# Patient Record
Sex: MALE | Race: BLACK OR AFRICAN AMERICAN | Employment: UNEMPLOYED | URBAN - NONMETROPOLITAN AREA
[De-identification: names, ages, dates, MRNs, and addresses within clinical notes are randomized per-mention and may not be internally consistent; named-entity substitution may affect disease eponyms.]

---

## 2020-01-23 ENCOUNTER — HOSPITAL ENCOUNTER (EMERGENCY)
Age: 20
Discharge: HOME | End: 2020-01-23
Payer: COMMERCIAL

## 2020-01-23 VITALS
DIASTOLIC BLOOD PRESSURE: 77 MMHG | BODY MASS INDEX: 29.8 KG/M2 | RESPIRATION RATE: 16 BRPM | SYSTOLIC BLOOD PRESSURE: 136 MMHG | OXYGEN SATURATION: 99 % | WEIGHT: 220 LBS | HEIGHT: 72 IN | TEMPERATURE: 98.2 F | HEART RATE: 73 BPM

## 2020-01-23 LAB
FLU A ANTIGEN: NEGATIVE
FLU B ANTIGEN: NEGATIVE
GROUP A STREP CULTURE, REFLEX: NEGATIVE
REFLEX THROAT C + S: NORMAL

## 2020-01-23 PROCEDURE — 2500000003 HC RX 250 WO HCPCS: Performed by: NURSE PRACTITIONER

## 2020-01-23 PROCEDURE — 87880 STREP A ASSAY W/OPTIC: CPT

## 2020-01-23 PROCEDURE — 87070 CULTURE OTHR SPECIMN AEROBIC: CPT

## 2020-01-23 PROCEDURE — 6370000000 HC RX 637 (ALT 250 FOR IP): Performed by: NURSE PRACTITIONER

## 2020-01-23 PROCEDURE — 99283 EMERGENCY DEPT VISIT LOW MDM: CPT

## 2020-01-23 PROCEDURE — 87804 INFLUENZA ASSAY W/OPTIC: CPT

## 2020-01-23 RX ORDER — NAPROXEN 500 MG/1
250 TABLET ORAL 2 TIMES DAILY WITH MEALS
Qty: 15 TABLET | Refills: 0 | Status: SHIPPED | OUTPATIENT
Start: 2020-01-23 | End: 2020-11-25 | Stop reason: SINTOL

## 2020-01-23 RX ORDER — NAPROXEN 250 MG/1
250 TABLET ORAL ONCE
Status: COMPLETED | OUTPATIENT
Start: 2020-01-23 | End: 2020-01-23

## 2020-01-23 RX ORDER — NAPROXEN 250 MG/1
TABLET ORAL
Status: DISCONTINUED
Start: 2020-01-23 | End: 2020-01-23 | Stop reason: HOSPADM

## 2020-01-23 RX ORDER — CETIRIZINE HYDROCHLORIDE 10 MG/1
10 TABLET ORAL DAILY
Qty: 30 TABLET | Refills: 0 | Status: SHIPPED | OUTPATIENT
Start: 2020-01-23 | End: 2020-02-22

## 2020-01-23 RX ADMIN — NAPROXEN 250 MG: 250 TABLET ORAL at 10:27

## 2020-01-23 RX ADMIN — PHENYLEPHRINE HYDROCHLORIDE 1 SPRAY: 0.5 SPRAY NASAL at 09:46

## 2020-01-23 RX ADMIN — Medication 5 ML: at 09:46

## 2020-01-23 ASSESSMENT — ENCOUNTER SYMPTOMS
FACIAL SWELLING: 0
BACK PAIN: 0
WHEEZING: 0
EYE REDNESS: 0
CHEST TIGHTNESS: 0
NAUSEA: 0
COUGH: 1
EYE PAIN: 0
DIARRHEA: 0
SINUS PAIN: 1
CHOKING: 0
SHORTNESS OF BREATH: 0
SORE THROAT: 1
VOICE CHANGE: 0
RHINORRHEA: 1
COLOR CHANGE: 0
SINUS PRESSURE: 1
VOMITING: 0
TROUBLE SWALLOWING: 0

## 2020-01-23 ASSESSMENT — PAIN DESCRIPTION - PROGRESSION: CLINICAL_PROGRESSION: NOT CHANGED

## 2020-01-23 ASSESSMENT — PAIN DESCRIPTION - PAIN TYPE: TYPE: ACUTE PAIN

## 2020-01-23 ASSESSMENT — PAIN SCALES - GENERAL
PAINLEVEL_OUTOF10: 4
PAINLEVEL_OUTOF10: 4

## 2020-01-23 ASSESSMENT — PAIN DESCRIPTION - FREQUENCY: FREQUENCY: CONTINUOUS

## 2020-01-23 ASSESSMENT — PAIN DESCRIPTION - ONSET: ONSET: ON-GOING

## 2020-01-23 ASSESSMENT — PAIN DESCRIPTION - ORIENTATION: ORIENTATION: RIGHT

## 2020-01-23 ASSESSMENT — PAIN DESCRIPTION - LOCATION: LOCATION: EAR

## 2020-01-23 ASSESSMENT — PAIN DESCRIPTION - DESCRIPTORS: DESCRIPTORS: ACHING

## 2020-01-23 NOTE — ED PROVIDER NOTES
change and wound. Allergic/Immunologic: Negative for immunocompromised state. Neurological: Negative for dizziness and weakness. Hematological: Positive for adenopathy. Psychiatric/Behavioral: Negative for agitation, behavioral problems and confusion. The patient is not nervous/anxious. PAST MEDICAL HISTORY   History reviewed. No pertinent past medical history. SURGICALHISTORY      has no past surgical history on file. CURRENT MEDICATIONS       Discharge Medication List as of 1/23/2020 10:26 AM          ALLERGIES     has No Known Allergies. FAMILY HISTORY     has no family status information on file. family history is not on file.     SOCIAL HISTORY       Social History     Socioeconomic History    Marital status: Single     Spouse name: Not on file    Number of children: Not on file    Years of education: Not on file    Highest education level: Not on file   Occupational History    Not on file   Social Needs    Financial resource strain: Not on file    Food insecurity:     Worry: Not on file     Inability: Not on file    Transportation needs:     Medical: Not on file     Non-medical: Not on file   Tobacco Use    Smoking status: Never Smoker    Smokeless tobacco: Never Used   Substance and Sexual Activity    Alcohol use: Yes     Comment: occ    Drug use: Yes     Types: Marijuana     Comment: last used last week     Sexual activity: Not on file   Lifestyle    Physical activity:     Days per week: Not on file     Minutes per session: Not on file    Stress: Not on file   Relationships    Social connections:     Talks on phone: Not on file     Gets together: Not on file     Attends Latter day service: Not on file     Active member of club or organization: Not on file     Attends meetings of clubs or organizations: Not on file     Relationship status: Not on file    Intimate partner violence:     Fear of current or ex partner: Not on file     Emotionally abused: Not on file present. Mental Status: He is alert and oriented to person, place, and time. Psychiatric:         Mood and Affect: Mood normal.         Behavior: Behavior normal.         Thought Content: Thought content normal.         Judgment: Judgment normal.         DIFFERENTIAL DIAGNOSIS:   Viral URI, influenza, pneumonia    DIAGNOSTIC RESULTS       RADIOLOGY: non-plainfilm images(s) such as CT, Ultrasound and MRI are read by the radiologist.  Plain radiographic images are visualized and preliminarily interpreted by the emergency physician unless otherwise stated below. No orders to display         LABS:   Labs Reviewed   RAPID INFLUENZA A/B ANTIGENS   THROAT CULTURE    Narrative:     Source: throat       Site:           Current Antibiotics: not stated   GROUP A STREP, REFLEX       EMERGENCY DEPARTMENT COURSE:   Vitals:    Vitals:    01/23/20 0908   BP: 136/77   Pulse: 73   Resp: 16   Temp: 98.2 °F (36.8 °C)   TempSrc: Oral   SpO2: 99%   Weight: 220 lb (99.8 kg)   Height: 6' (1.829 m)       MDM  Patient was seen and evaluated in the emergency department, patient appeared to be no acute distress, vital signs were reviewed, no significant findings were noted. Physical exam was completed, he had no significant tonsillar edema erythema or exudate, he had middle ear effusion in the right ear, he had some anterior cervical lymphadenopathy, influenza and strep testing were completed. Results were negative. He was treated with Magic mouthwash, and Afrin, and Naprosyn. I noted improvement in symptoms. I discussed my finding my plan of care with patient he was amenable with discharge. Medications   magic (miracle) mouthwash (5 mLs Swish & Swallow Given 1/23/20 0946)   phenylephrine (BRAYDON-SYNEPHRINE) 0.5 % nasal spray 1 spray (1 spray Each Nostril Given 1/23/20 0946)   naproxen (NAPROSYN) tablet 250 mg (250 mg Oral Given 1/23/20 1027)       Patient was seenindependently by myself.  The patient's final impression and disposition and plan was determined by myself. CRITICAL CARE:   None    CONSULTS:  None    PROCEDURES:  None    FINAL IMPRESSION     1. Viral URI with cough          DISPOSITION/PLAN   Patient was discharged in stable condition    PATIENT REFERREDTO:  [facility] [place] [address],[address]  Call in 1 week  If symptoms worsen or fail to improve      DISCHARGE MEDICATIONS:  Discharge Medication List as of 1/23/2020 10:26 AM      START taking these medications    Details   cetirizine (ZYRTEC) 10 MG tablet Take 1 tablet by mouth daily, Disp-30 tablet, R-0Print      naproxen (NAPROSYN) 500 MG tablet Take 0.5 tablets by mouth 2 times daily (with meals) for 30 doses, Disp-15 tablet, R-0Print      Magic Mouthwash (MIRACLE MOUTHWASH) Swish and swallow 5 mLs 4 times daily as needed for Irritation 1:1:1, lidocaine, diphenhydramine, maalox, Disp-240 mL, R-0Print             (Please note that portions of this note were completed with a voice recognition program.  Efforts were made to edit the dictations but occasionally words are mis-transcribed.)      Provider:  I personally performed the services described in the documentation,reviewed and edited the documentation which was dictated to the scribe in my presence, and it accurately records my words and actions.     Bossman Stone CNP 01/23/20 8:55 PM    Dori Stone, APRN - CNP        Spyder Lynk, TAMARA - CNP  01/23/20 5735

## 2020-01-25 LAB — THROAT/NOSE CULTURE: NORMAL

## 2020-07-07 ENCOUNTER — HOSPITAL ENCOUNTER (EMERGENCY)
Age: 20
Discharge: HOME | End: 2020-07-07
Payer: COMMERCIAL

## 2020-07-07 VITALS
WEIGHT: 215 LBS | TEMPERATURE: 97.9 F | DIASTOLIC BLOOD PRESSURE: 91 MMHG | HEART RATE: 71 BPM | HEIGHT: 72 IN | RESPIRATION RATE: 18 BRPM | SYSTOLIC BLOOD PRESSURE: 142 MMHG | BODY MASS INDEX: 29.12 KG/M2 | OXYGEN SATURATION: 99 %

## 2020-07-07 PROCEDURE — U0003 INFECTIOUS AGENT DETECTION BY NUCLEIC ACID (DNA OR RNA); SEVERE ACUTE RESPIRATORY SYNDROME CORONAVIRUS 2 (SARS-COV-2) (CORONAVIRUS DISEASE [COVID-19]), AMPLIFIED PROBE TECHNIQUE, MAKING USE OF HIGH THROUGHPUT TECHNOLOGIES AS DESCRIBED BY CMS-2020-01-R: HCPCS

## 2020-07-07 PROCEDURE — 99213 OFFICE O/P EST LOW 20 MIN: CPT

## 2020-07-07 PROCEDURE — 99214 OFFICE O/P EST MOD 30 MIN: CPT | Performed by: NURSE PRACTITIONER

## 2020-07-07 ASSESSMENT — ENCOUNTER SYMPTOMS
ABDOMINAL PAIN: 0
VOMITING: 0
NAUSEA: 0
DIARRHEA: 0
COUGH: 0
EYE REDNESS: 0
SORE THROAT: 0
EYE ITCHING: 0
SHORTNESS OF BREATH: 0
SINUS PRESSURE: 0

## 2020-07-07 NOTE — ED TRIAGE NOTES
Patient states he was at a party Saturday where he was in contact with a family member that has now tested positive for COVID.

## 2020-07-07 NOTE — ED PROVIDER NOTES
[address]       Chief Complaint   Patient presents with    Concern For COVID-19       Nurses Notes reviewed and I agree except as noted in the HPI. HISTORY OF PRESENT ILLNESS   Nael Lopez is a 22 y.o. male who presents with request for COVID-19 testing after exposure to someone that has tested positive for COVID-19 at a Fourth of July party over the weekend. States that he does not have any symptoms and is just requesting testing. REVIEW OF SYSTEMS     Review of Systems   Constitutional: Negative for chills and fever. HENT: Negative for congestion, ear pain, sinus pressure and sore throat. Eyes: Negative for redness and itching. Respiratory: Negative for cough and shortness of breath. Cardiovascular: Negative for chest pain and palpitations. Gastrointestinal: Negative for abdominal pain, diarrhea, nausea and vomiting. Musculoskeletal: Negative for joint swelling and myalgias. Skin: Negative for rash. Allergic/Immunologic: Negative for environmental allergies and food allergies. Neurological: Negative for dizziness and headaches. PAST MEDICAL HISTORY   History reviewed. No pertinent past medical history. SURGICAL HISTORY     Patient  has no past surgical history on file. CURRENT MEDICATIONS       Previous Medications    MAGIC MOUTHWASH (MIRACLE MOUTHWASH)    Swish and swallow 5 mLs 4 times daily as needed for Irritation 1:1:1, lidocaine, diphenhydramine, maalox    NAPROXEN (NAPROSYN) 500 MG TABLET    Take 0.5 tablets by mouth 2 times daily (with meals) for 30 doses       ALLERGIES     Patient is has No Known Allergies. FAMILY HISTORY     Patient'sfamily history is not on file. SOCIAL HISTORY     Patient  reports that he has never smoked. He has never used smokeless tobacco. He reports current alcohol use. He reports current drug use. Drug: Marijuana.     PHYSICAL EXAM     ED TRIAGE VITALS  BP: (!) DISPOSITION    Discharge   COVID-19 test result will be known in 4 days. Self quarantine until test result is known. If applicable, work note has been provided to be off until COVID-19 test results. Follow with family provider for further work note needs. Physical assessment findings, diagnostic testing(s) if applicable, and vital signs reviewed with patient/patient representative. Questions answered. If applicable, patient/patient representative will be contacted upon receipt of final culture and sensitivity or other testing results when available. Any additions or changes to medications or changes the plan of care will be made at that time. Medications as directed, including OTC medications for supportive care. Education provided on medications. Differential diagnosis(s) discussed with patient/patient representative. Home care/self care instructions reviewed with patient/patient representative. Patient is to follow-up with family care provider in 2-3 days if no improvement. Patient is to go to the emergency department if symptoms worsen. Patient/patient representative is aware of care plan, questions answered, verbalizes understanding and is in agreement. Teach back method used for patient/patient representative teaching(s) and printed instructions attached to after visit summary. Problem List Items Addressed This Visit     None      Visit Diagnoses     Encounter for laboratory testing for COVID-19 virus    -  Primary    Exposure to COVID-19 virus              PATIENT REFERRED TO:  [facility]  [address]  [facility]  [phone]    as needed, If symptoms change/worsen, go to the [address] [place] [address],[address]  [address] [address] [facility] [address] [facility]  Schedule an appointment as soon as possible for a visit in 3 days  if you do not have a family provider, If symptoms change/worsen, go to the [address] [facility], [address], APRN - CNP  07/07/20 4085

## 2020-07-07 NOTE — ED NOTES
Patient verbalized understanding of discharge instructions. Denies questions or concerns at this time .      Dinora Caldwell RN  07/07/20 1038

## 2020-07-08 ENCOUNTER — CARE COORDINATION (OUTPATIENT)
Dept: CARE COORDINATION | Age: 20
End: 2020-07-08

## 2020-07-08 NOTE — CARE COORDINATION
Patient contacted regarding COVID-19 exposure. Discussed COVID-19 related testing which was pending at this time. Test results were pending. Patient informed of results, if available? N/A - test results remain pending    Care Transition Nurse/ Ambulatory Care Manager contacted the patient by telephone to perform post discharge assessment. Verified name and  with patient as identifiers. Provided introduction to self, and explanation of the CTN/ACM role, and reason for call due to risk factors for infection and/or exposure to COVID-19. Symptoms reviewed with patient who verbalized the following symptoms: no new symptoms, no worsening symptoms and remains concerned d/t possible recent exposure. .      Due to no new or worsening symptoms encounter was not routed to provider for escalation. Discussed follow-up appointments. If no appointment was previously scheduled, appointment scheduling offered: Patient declined assistance with scheduling f/u appointment but was provided with phone number to call and schedule and to become established as patient currently does not have PCP. [facility] follow up appointment(s): No future appointments. Non-[facility] follow up appointment(s): N/A      Patient has following risk factors of: no known risk factors. CTN/ACM reviewed discharge instructions, medical action plan and red flags such as increased shortness of breath, increasing fever and signs of decompensation with patient who verbalized understanding. Discussed exposure protocols and quarantine with CDC Guidelines What to do if you are sick with coronavirus disease .  Patient was given an opportunity for questions and concerns. The patient agrees to contact the [facility] exposure line [phone], local health department and PCP office for questions related to their healthcare. CTN/ACM provided contact information for future needs.     Reviewed and educated patient on any new and changed medications related to discharge

## 2020-07-10 LAB — SARS-COV-2: NOT DETECTED

## 2020-11-25 ENCOUNTER — HOSPITAL ENCOUNTER (EMERGENCY)
Age: 20
Discharge: HOME | End: 2020-11-25
Payer: COMMERCIAL

## 2020-11-25 VITALS
OXYGEN SATURATION: 97 % | DIASTOLIC BLOOD PRESSURE: 70 MMHG | HEART RATE: 94 BPM | WEIGHT: 218 LBS | TEMPERATURE: 98.5 F | BODY MASS INDEX: 29.57 KG/M2 | RESPIRATION RATE: 16 BRPM | SYSTOLIC BLOOD PRESSURE: 141 MMHG

## 2020-11-25 PROCEDURE — U0003 INFECTIOUS AGENT DETECTION BY NUCLEIC ACID (DNA OR RNA); SEVERE ACUTE RESPIRATORY SYNDROME CORONAVIRUS 2 (SARS-COV-2) (CORONAVIRUS DISEASE [COVID-19]), AMPLIFIED PROBE TECHNIQUE, MAKING USE OF HIGH THROUGHPUT TECHNOLOGIES AS DESCRIBED BY CMS-2020-01-R: HCPCS

## 2020-11-25 PROCEDURE — 99213 OFFICE O/P EST LOW 20 MIN: CPT | Performed by: NURSE PRACTITIONER

## 2020-11-25 PROCEDURE — 99213 OFFICE O/P EST LOW 20 MIN: CPT

## 2020-11-25 RX ORDER — ZINC SULFATE 50(220)MG
50 CAPSULE ORAL DAILY
Qty: 7 CAPSULE | Refills: 0 | COMMUNITY
Start: 2020-11-25 | End: 2020-12-02

## 2020-11-25 RX ORDER — ASCORBIC ACID 500 MG
500 TABLET ORAL 2 TIMES DAILY
Qty: 14 TABLET | Refills: 0 | COMMUNITY
Start: 2020-11-25 | End: 2020-12-02

## 2020-11-25 RX ORDER — ACETAMINOPHEN 500 MG
500 TABLET ORAL EVERY 4 HOURS PRN
Qty: 20 TABLET | Refills: 0 | COMMUNITY
Start: 2020-11-25 | End: 2020-12-05

## 2020-11-25 ASSESSMENT — ENCOUNTER SYMPTOMS
SWOLLEN GLANDS: 0
SINUS PRESSURE: 0
FACIAL SWELLING: 0
BACK PAIN: 0
CHOKING: 0
RHINORRHEA: 0
SHORTNESS OF BREATH: 0
WHEEZING: 0
CHEST TIGHTNESS: 0
COUGH: 1
TROUBLE SWALLOWING: 0
VOICE CHANGE: 0
APNEA: 0
SORE THROAT: 0
STRIDOR: 0
COLOR CHANGE: 0
SINUS PAIN: 0

## 2020-11-25 NOTE — ED PROVIDER NOTES
[facility]  Urgent Care Encounter      CHIEF COMPLAINT       Chief Complaint   Patient presents with    Chills    Covid Testing       Nurses Notes reviewed and I agree except as noted in the HPI. HISTORY OFPRESENT ILLNESS   Alexx Chan is a 23 y.o.  HPI  The history is provided by the patient. No  was used. URI   Presenting symptoms: cough    Presenting symptoms: no congestion, no ear pain, no facial pain, no fatigue, no fever, no rhinorrhea and no sore throat    Cough:     Cough characteristics:  Dry  Severity:  Mild  Onset quality:  Gradual  Duration:  3 days  Timing:  Intermittent  Progression:  Unchanged  Chronicity:  New  Relieved by:  None tried  Worsened by:  Nothing  Ineffective treatments:  None tried  Associated symptoms: no arthralgias, no headaches, no myalgias, no neck pain, no sinus pain, no sneezing, no swollen glands and no wheezing    Risk factors: not elderly, no chronic cardiac disease, no chronic kidney disease, no chronic respiratory disease, no diabetes mellitus, no immunosuppression, no recent illness, no recent travel and no sick contacts    REVIEW OF SYSTEMS     Review of Systems  Constitutional: Positive for chills and diaphoresis. Negative for activity change, appetite change, fatigue, fever and unexpected weight change. HENT: Positive for postnasal drip. Negative for congestion, dental problem, drooling, ear discharge, ear pain, facial swelling, hearing loss, mouth sores, nosebleeds, rhinorrhea, sinus pressure, sinus pain, sneezing, sore throat, tinnitus, trouble swallowing and voice change. Respiratory: Positive for cough. Negative for apnea, choking, chest tightness, shortness of breath, wheezing and stridor. Musculoskeletal: Negative for arthralgias, back pain, gait problem, joint swelling, myalgias, neck pain and neck stiffness. Skin: Negative for color change, pallor, rash and wound.    Neurological: Negative for headaches. Psychiatric/Behavioral: Positive for sleep disturbance. Negative for agitation, behavioral problems, confusion, decreased concentration, dysphoric mood, hallucinations, self-injury and suicidal ideas. The patient is not nervous/anxious and is not hyperactive. PAST MEDICAL HISTORY   History reviewed. No pertinent past medical history. SURGICAL HISTORY     Patient  has no past surgical history on file. CURRENT MEDICATIONS       Previous Medications    No medications on file       ALLERGIES     Patient is has No Known Allergies. FAMILY HISTORY     Patient's family history is not on file. SOCIAL HISTORY     Patient  reports that he has never smoked. He has never used smokeless tobacco. He reports current alcohol use. He reports current drug use. Drug: Marijuana. PHYSICAL EXAM     ED TRIAGE VITALS  BP: (!) 141/70, Temp: 98.5 °F (36.9 °C), Pulse: 94, Resp: 16, SpO2: 97 %  Physical Exam     Physical Exam  Vitals signs reviewed. Constitutional:       General: He is awake. He is not in acute distress. Appearance: Normal appearance. He is well-developed, well-groomed and normal weight. He is not ill-appearing, toxic-appearing or diaphoretic. Interventions: He is not intubated. HENT:      Head: Normocephalic and atraumatic. Right Ear: Hearing and external ear normal. No decreased hearing noted. No laceration, drainage, swelling or tenderness. No middle ear effusion. There is no impacted cerumen. No foreign body. No PE tube. Tympanic membrane is injected and erythematous. Tympanic membrane is not scarred, perforated or bulging. Left Ear: Hearing and external ear normal. No decreased hearing noted. No laceration, drainage, swelling or tenderness. No middle ear effusion. There is no impacted cerumen. No foreign body. No PE tube. Tympanic membrane is injected and erythematous. Tympanic membrane is not scarred, perforated or bulging.       Nose: Nose normal. No nasal deformity, septal deviation, signs of injury, laceration, nasal tenderness, congestion or rhinorrhea. Mouth/Throat:      Lips: Pink. No lesions. Mouth: Mucous membranes are moist. No injury, lacerations, oral lesions or angioedema. Dentition: Normal dentition. Does not have dentures. No dental tenderness, gingival swelling, dental caries, dental abscesses or gum lesions. Tongue: No lesions. Tongue does not deviate from midline. Palate: No mass and lesions. Pharynx: Oropharynx is clear. Uvula midline. No pharyngeal swelling, oropharyngeal exudate, posterior oropharyngeal erythema or uvula swelling. Neck:      Musculoskeletal: Normal range of motion. Cardiovascular:      Rate and Rhythm: Normal rate. Heart sounds: Normal heart sounds, S1 normal and S2 normal. Heart sounds not distant. No murmur. No friction rub. No gallop. Pulmonary:      Effort: Pulmonary effort is normal. No tachypnea, bradypnea, accessory muscle usage, prolonged expiration, respiratory distress or retractions. He is not intubated. Breath sounds: Normal breath sounds. No stridor or decreased air movement. No decreased breath sounds, wheezing, rhonchi or rales. Chest:      Chest wall: No tenderness. Musculoskeletal: Normal range of motion. Skin:     General: Skin is warm and dry. Coloration: Skin is not jaundiced or pale. Findings: No bruising, erythema, lesion or rash. Neurological:      Mental Status: He is alert, oriented to person, place, and time and easily aroused. Psychiatric:         Mood and Affect: Mood normal.         Behavior: Behavior normal. Behavior is cooperative. Thought Content: Thought content normal.         Judgment: Judgment normal.   DIAGNOSTIC RESULTS   Labs:No results found for this visit on 11/25/20.     IMAGING:  No orders to display     URGENT CARE COURSE:     Vitals:    11/25/20 1537   BP: (!) 141/70   Pulse: 94   Resp: 16   Temp: 98.5 °F (36.9 °C)   TempSrc:

## 2020-11-25 NOTE — ED NOTES
Pt verbalized discharge instructions. Pt informed to go to ER if develop chest pain, shortness of breath or abdominal pain. Pt ambulatory out in stable condition. Assessment unchanged.        Ofelia Benoit RN  11/25/20 4837

## 2020-11-25 NOTE — ED PROVIDER NOTES
**This is a Medical/ PA/ APRN Student Note and is charted for educational purposes. The non-physician staff attested note is not to be used for billing purposes or to guide patient care. Please see the physician modifications/ attestation for treatment plan/suggestions. This note has been reviewed and feedback has been provided to the student. *     [facility]  Urgent Care Encounter       CHIEF COMPLAINT       Chief Complaint   Patient presents with    Chills    Covid Testing       Nurses Notes reviewed and I agree except as noted in the HPI. HISTORY OF PRESENT ILLNESS   Candido Rudolph is a 23 y.o. male who presents to the [address] [facility] for evaluation. Patient denies loss of sense of smell or taste. The history is provided by the patient. No  was used. URI   Presenting symptoms: cough    Presenting symptoms: no congestion, no ear pain, no facial pain, no fatigue, no fever, no rhinorrhea and no sore throat    Cough:     Cough characteristics:  Dry  Severity:  Mild  Onset quality:  Gradual  Duration:  3 days  Timing:  Intermittent  Progression:  Unchanged  Chronicity:  New  Relieved by:  None tried  Worsened by:  Nothing  Ineffective treatments:  None tried  Associated symptoms: no arthralgias, no headaches, no myalgias, no neck pain, no sinus pain, no sneezing, no swollen glands and no wheezing    Risk factors: not elderly, no chronic cardiac disease, no chronic kidney disease, no chronic respiratory disease, no diabetes mellitus, no immunosuppression, no recent illness, no recent travel and no sick contacts        REVIEW OF SYSTEMS     Review of Systems   Constitutional: Positive for chills and diaphoresis. Negative for activity change, appetite change, fatigue, fever and unexpected weight change. HENT: Positive for postnasal drip.  Negative for congestion, dental problem, drooling, ear discharge, ear pain, facial swelling, hearing loss, mouth sores, distress. Appearance: Normal appearance. He is well-developed, well-groomed and normal weight. He is not ill-appearing, toxic-appearing or diaphoretic. Interventions: He is not intubated. HENT:      Head: Normocephalic and atraumatic. Right Ear: Hearing and external ear normal. No decreased hearing noted. No laceration, drainage, swelling or tenderness. No middle ear effusion. There is no impacted cerumen. No foreign body. No PE tube. Tympanic membrane is injected and erythematous. Tympanic membrane is not scarred, perforated or bulging. Left Ear: Hearing and external ear normal. No decreased hearing noted. No laceration, drainage, swelling or tenderness. No middle ear effusion. There is no impacted cerumen. No foreign body. No PE tube. Tympanic membrane is injected and erythematous. Tympanic membrane is not scarred, perforated or bulging. Nose: Nose normal. No nasal deformity, septal deviation, signs of injury, laceration, nasal tenderness, congestion or rhinorrhea. Mouth/Throat:      Lips: Pink. No lesions. Mouth: Mucous membranes are moist. No injury, lacerations, oral lesions or angioedema. Dentition: Normal dentition. Does not have dentures. No dental tenderness, gingival swelling, dental caries, dental abscesses or gum lesions. Tongue: No lesions. Tongue does not deviate from midline. Palate: No mass and lesions. Pharynx: Oropharynx is clear. Uvula midline. No pharyngeal swelling, oropharyngeal exudate, posterior oropharyngeal erythema or uvula swelling. Neck:      Musculoskeletal: Normal range of motion. Cardiovascular:      Rate and Rhythm: Normal rate. Heart sounds: Normal heart sounds, S1 normal and S2 normal. Heart sounds not distant. No murmur. No friction rub. No gallop. Pulmonary:      Effort: Pulmonary effort is normal. No tachypnea, bradypnea, accessory muscle usage, prolonged expiration, respiratory distress or retractions.  He is reviewed and feedback has been provided to the student.  Enrique Medina  11/25/20 3578 Depressed

## 2020-11-27 ENCOUNTER — CARE COORDINATION (OUTPATIENT)
Dept: FAMILY MEDICINE CLINIC | Age: 20
End: 2020-11-27

## 2020-11-27 NOTE — CARE COORDINATION
ACM attempted outreach for ED follow, viral symptoms, COVID 19 Protocol    No answer and unable to LVM at this time.      COVID 19 testing-pending

## 2020-11-28 LAB — SARS-COV-2: NOT DETECTED

## 2021-05-04 ENCOUNTER — APPOINTMENT (OUTPATIENT)
Dept: GENERAL RADIOLOGY | Age: 21
End: 2021-05-04
Payer: COMMERCIAL

## 2021-05-04 ENCOUNTER — HOSPITAL ENCOUNTER (EMERGENCY)
Age: 21
Discharge: HOME | End: 2021-05-04
Payer: COMMERCIAL

## 2021-05-04 VITALS
HEART RATE: 62 BPM | WEIGHT: 195 LBS | OXYGEN SATURATION: 100 % | DIASTOLIC BLOOD PRESSURE: 74 MMHG | SYSTOLIC BLOOD PRESSURE: 145 MMHG | RESPIRATION RATE: 18 BRPM | HEIGHT: 71 IN | BODY MASS INDEX: 27.3 KG/M2

## 2021-05-04 DIAGNOSIS — M25.562 ACUTE PAIN OF LEFT KNEE: Primary | ICD-10-CM

## 2021-05-04 PROCEDURE — 99283 EMERGENCY DEPT VISIT LOW MDM: CPT

## 2021-05-04 PROCEDURE — 73564 X-RAY EXAM KNEE 4 OR MORE: CPT

## 2021-05-04 PROCEDURE — 6370000000 HC RX 637 (ALT 250 FOR IP): Performed by: PHYSICIAN ASSISTANT

## 2021-05-04 RX ORDER — IBUPROFEN 800 MG/1
800 TABLET ORAL EVERY 8 HOURS PRN
Qty: 15 TABLET | Refills: 0 | Status: SHIPPED | OUTPATIENT
Start: 2021-05-04

## 2021-05-04 RX ORDER — IBUPROFEN 800 MG/1
800 TABLET ORAL EVERY 8 HOURS PRN
Qty: 15 TABLET | Refills: 0 | Status: SHIPPED | OUTPATIENT
Start: 2021-05-04 | End: 2021-05-04 | Stop reason: SDUPTHER

## 2021-05-04 RX ORDER — IBUPROFEN 800 MG/1
800 TABLET ORAL ONCE
Status: COMPLETED | OUTPATIENT
Start: 2021-05-04 | End: 2021-05-04

## 2021-05-04 RX ADMIN — IBUPROFEN 800 MG: 800 TABLET, FILM COATED ORAL at 14:58

## 2021-05-04 ASSESSMENT — PAIN DESCRIPTION - LOCATION: LOCATION: LEG

## 2021-05-04 ASSESSMENT — PAIN DESCRIPTION - ORIENTATION: ORIENTATION: LEFT

## 2021-05-04 ASSESSMENT — PAIN DESCRIPTION - PAIN TYPE: TYPE: ACUTE PAIN

## 2021-05-04 ASSESSMENT — ENCOUNTER SYMPTOMS: RESPIRATORY NEGATIVE: 1

## 2021-05-04 ASSESSMENT — PAIN SCALES - GENERAL: PAINLEVEL_OUTOF10: 6

## 2021-05-04 NOTE — ED NOTES
Patient presents to the ED with complaints of left leg pain. No known injury to the area. He states that right below his left knee there is a knot. He also states that his pain is worse if he bends his knee.      Noel Thurston, LPN  23/89/98 6900

## 2021-05-04 NOTE — ED PROVIDER NOTES
[facility] EMERGENCY DEPT      CHIEF COMPLAINT       Chief Complaint   Patient presents with    Leg Pain     left       Nurses Notes reviewed and I agree except as noted in the HPI. HISTORY OF PRESENT ILLNESS    Miley Banda is a 23 y.o. male who presents for evaluation of L leg pain specifically just below his L knee. Patient states the pain began yesterday - notes no injury preceded the pain. Pain is described as constant, achy and throbbing. Patient notes he pain is worse with activity and improves slightly with rest. Pain is rated a 6/10 currently and 9/10 at its worst. Patient did take a \"pain relief pill\" this morning that he states is helping with the pain. Patient denies any injury, or any other complaints at this time. Location/Symptom: L knee pain  Timing/Onset: 5/3/2021  Quality: achy, throbbing  Duration: constant  Modifying Factors: relief with \"pain relief pill\"  Severity: 6-9/10    REVIEW OF SYSTEMS     Review of Systems   Constitutional: Positive for activity change. Negative for fever. HENT: Negative for rhinorrhea. Eyes: Negative for photophobia. Respiratory: Negative. Negative for shortness of breath. Cardiovascular: Negative. Negative for chest pain. Gastrointestinal: Negative for nausea and vomiting. Musculoskeletal: Positive for arthralgias, joint swelling and myalgias. Negative for back pain and neck pain. Skin: Negative for rash and wound. Neurological: Negative for weakness and numbness. Psychiatric/Behavioral: Negative for confusion. PAST MEDICAL HISTORY    has no past medical history on file. SURGICAL HISTORY      has no past surgical history on file.     CURRENT MEDICATIONS       Current Discharge Medication List      CONTINUE these medications which have NOT CHANGED    Details   acetaminophen (TYLENOL) 500 MG tablet Take 1 tablet by mouth every 4 hours as needed for Pain or Fever  Qty: 20 tablet, Refills: 0      vitamin C (ASCORBIC ACID) 500 MG tablet Take 1 tablet by mouth 2 times daily for 7 days  Qty: 14 tablet, Refills: 0      zinc sulfate (ZINCATE) 220 (50 Zn) MG capsule Take 1 capsule by mouth daily for 7 days  Qty: 7 capsule, Refills: 0             ALLERGIES     has No Known Allergies. FAMILY HISTORY     has no family status information on file. family history is not on file. SOCIAL HISTORY    reports that he has never smoked. He has never used smokeless tobacco. He reports current alcohol use. He reports current drug use. Drug: Marijuana. PHYSICAL EXAM     INITIAL VITALS:  height is 5' 11\" (1.803 m) and weight is 195 lb (88.5 kg). His blood pressure is 145/74 (abnormal) and his pulse is 62. His respiration is 18 and oxygen saturation is 100%. Physical Exam  Vitals signs and nursing note reviewed. Constitutional:       General: He is not in acute distress. Appearance: Normal appearance. He is well-developed. He is not toxic-appearing or diaphoretic. HENT:      Head: Normocephalic and atraumatic. Right Ear: Hearing normal.      Left Ear: Hearing normal.      Nose: Nose normal. No nasal deformity. Eyes:      General: Lids are normal.      Conjunctiva/sclera: Conjunctivae normal.   Neck:      Musculoskeletal: No neck rigidity. Trachea: Trachea normal. No tracheal deviation. Cardiovascular:      Rate and Rhythm: Normal rate and regular rhythm. Pulses:           Dorsalis pedis pulses are 2+ on the right side and 2+ on the left side. Posterior tibial pulses are 2+ on the right side and 2+ on the left side. Pulmonary:      Effort: Pulmonary effort is normal. No tachypnea. Breath sounds: Normal breath sounds. Abdominal:      General: There is no distension. Palpations: Abdomen is soft. Musculoskeletal:      Left knee: Tenderness found. Patellar tendon tenderness noted. Legs:    Skin:     General: Skin is warm and dry. Coloration: Skin is not pale. Findings: No rash. Neurological:      Mental Status: He is alert and oriented to person, place, and time. GCS: GCS eye subscore is 4. GCS verbal subscore is 5. GCS motor subscore is 6. Sensory: Sensation is intact. No sensory deficit. Motor: Motor function is intact. Gait: Gait normal.   Psychiatric:         Speech: Speech normal.         Behavior: Behavior normal. Behavior is cooperative. Thought Content: Thought content normal.       DIFFERENTIAL DIAGNOSIS:   Including but not limited to: Osgood-Schlatter disease, patellar tendonitis, strain, contusion, less likely but considered tibial tuberosity fracture    DIAGNOSTIC RESULTS     EKG: All EKG's are interpreted by [facility] Department Physician who either signs or Co-signs this chart in the absence of a cardiologist.  None    RADIOLOGY: non-plain film images(s) such as CT,Ultrasound and MRI are read by the radiologist.  Plain radiographic images are visualized and preliminarily interpreted by the emergency physician unless otherwise stated below. XR KNEE LEFT (MIN 4 VIEWS)   Final Result   Mild soft tissue swelling anteriorly. No fracture. .            **This report has been created using voice recognition software. It may contain minor errors which are inherent in voice recognition technology. **            Final report electronically signed by Dr. Chasidy Lyles on 5/4/2021 2:30 PM          LABS:   Labs Reviewed - No data to display    EMERGENCY DEPARTMENT COURSE:   Vitals:    Vitals:    05/04/21 1247   BP: (!) 145/74   Pulse: 62   Resp: 18   TempSrc: Oral   SpO2: 100%   Weight: 195 lb (88.5 kg)   Height: 5' 11\" (1.803 m)       MDM:  Mr. Alva Lopez presents to the ED for evaluation of L leg pain that is localized to his L tibial tuberosity. Patient denies any injury but exquisite tenderness over the L tibial tuberosity was noted on exam. Patient is neuro-vascularly intact distally and denies any numbness or tingling.  Patient to undergo 3 view knee XR

## 2021-05-05 ASSESSMENT — ENCOUNTER SYMPTOMS
RHINORRHEA: 0
PHOTOPHOBIA: 0
NAUSEA: 0
SHORTNESS OF BREATH: 0
VOMITING: 0
BACK PAIN: 0

## 2021-06-07 ENCOUNTER — HOSPITAL ENCOUNTER (EMERGENCY)
Age: 21
Discharge: HOME | End: 2021-06-07
Payer: COMMERCIAL

## 2021-06-07 VITALS
DIASTOLIC BLOOD PRESSURE: 82 MMHG | WEIGHT: 250 LBS | SYSTOLIC BLOOD PRESSURE: 147 MMHG | RESPIRATION RATE: 16 BRPM | HEIGHT: 72 IN | HEART RATE: 99 BPM | OXYGEN SATURATION: 99 % | BODY MASS INDEX: 33.86 KG/M2

## 2021-06-07 DIAGNOSIS — J40 BRONCHITIS: ICD-10-CM

## 2021-06-07 DIAGNOSIS — H66.92 LEFT OTITIS MEDIA, UNSPECIFIED OTITIS MEDIA TYPE: Primary | ICD-10-CM

## 2021-06-07 LAB
FLU A ANTIGEN: NEGATIVE
FLU B ANTIGEN: NEGATIVE
SARS-COV-2, NAAT: NOT DETECTED

## 2021-06-07 PROCEDURE — 99282 EMERGENCY DEPT VISIT SF MDM: CPT

## 2021-06-07 PROCEDURE — 87635 SARS-COV-2 COVID-19 AMP PRB: CPT

## 2021-06-07 PROCEDURE — 87804 INFLUENZA ASSAY W/OPTIC: CPT

## 2021-06-07 RX ORDER — AMOXICILLIN AND CLAVULANATE POTASSIUM 875; 125 MG/1; MG/1
1 TABLET, FILM COATED ORAL 2 TIMES DAILY
Qty: 20 TABLET | Refills: 0 | Status: SHIPPED | OUTPATIENT
Start: 2021-06-07 | End: 2021-06-17

## 2021-06-07 RX ORDER — GUAIFENESIN/DEXTROMETHORPHAN 100-10MG/5
5 SYRUP ORAL 3 TIMES DAILY PRN
Qty: 120 ML | Refills: 0 | Status: SHIPPED | OUTPATIENT
Start: 2021-06-07 | End: 2021-06-17

## 2021-06-07 RX ORDER — CETIRIZINE HYDROCHLORIDE 10 MG/1
10 TABLET ORAL DAILY
Qty: 30 TABLET | Refills: 0 | Status: SHIPPED | OUTPATIENT
Start: 2021-06-07 | End: 2021-07-07

## 2021-06-07 ASSESSMENT — PAIN SCALES - GENERAL: PAINLEVEL_OUTOF10: 1

## 2021-06-07 ASSESSMENT — PAIN DESCRIPTION - PAIN TYPE: TYPE: ACUTE PAIN

## 2021-06-07 ASSESSMENT — ENCOUNTER SYMPTOMS: COUGH: 1

## 2021-06-07 NOTE — ED PROVIDER NOTES
[facility]  eMERGENCY dEPARTMENT eNCOUnter          CHIEF COMPLAINT       Chief Complaint   Patient presents with    Cough    Otalgia     left       Nurses Notes reviewed and I agree except as noted inthe HPI. HISTORY OF PRESENT ILLNESS    Tin Wahl is a 23 y.o. male who presents to the Emergency Department for the evaluation of cold symptoms for the past week. Patient denied any recent sick contacts. States his symptoms have been persistent without improvement over the past week. He gets transient relief with ibuprofen, Robitussin and NyQuil. Symptoms have included congestion, left ear pain and unproductive cough. No fevers, chills, sore throat, chest tightness, wheezing, shortness of breath, vomiting or diarrhea. He denies any pulmonary history. He reports having 1 to 2 cigarettes on occasion but is not a daily smoker. Reports most bothersome complaint of left ear pain which he states worsens when it pops. Reports hearing is muffled/echoed in that ear. No drainage. The HPI was provided by the patient. REVIEW OF SYSTEMS     Review of Systems   HENT: Positive for congestion and ear pain. Respiratory: Positive for cough. All other systems reviewed and are negative. PAST MEDICAL HISTORY    has no past medical history on file. SURGICAL HISTORY      has no past surgical history on file.     CURRENT MEDICATIONS       Discharge Medication List as of 6/7/2021  7:09 PM      CONTINUE these medications which have NOT CHANGED    Details   ibuprofen (ADVIL;MOTRIN) 800 MG tablet Take 1 tablet by mouth every 8 hours as needed for Pain, Disp-15 tablet, R-0Normal      acetaminophen (TYLENOL) 500 MG tablet Take 1 tablet by mouth every 4 hours as needed for Pain or Fever, Disp-20 tablet,R-0OTC      vitamin C (ASCORBIC ACID) 500 MG tablet Take 1 tablet by mouth 2 times daily for 7 days, Disp-14 tablet,R-0OTC      zinc sulfate (ZINCATE) 220 (50 Zn) MG capsule Take 1 capsule by mouth daily for 7 days, Disp-7 capsule,R-0OTC             ALLERGIES     has No Known Allergies. FAMILY HISTORY     has no family status information on file. family history is not on file. SOCIAL HISTORY      reports that he has never smoked. He has never used smokeless tobacco. He reports current alcohol use. He reports current drug use. Drug: Marijuana. PHYSICAL EXAM     INITIAL VITALS:  height is 6' (1.829 m) and weight is 250 lb (113.4 kg). His blood pressure is 147/82 (abnormal) and his pulse is 99. His respiration is 16 and oxygen saturation is 99%. Physical Exam  Vitals and nursing note reviewed. Constitutional:       Appearance: Normal appearance. HENT:      Head: Normocephalic and atraumatic. Right Ear: No swelling or tenderness. There is no impacted cerumen. No mastoid tenderness. Tympanic membrane is erythematous and retracted. Tympanic membrane is not perforated or bulging. Left Ear: Tympanic membrane normal. No mastoid tenderness. Nose: Mucosal edema and congestion present. No nasal tenderness. Eyes:      Conjunctiva/sclera: Conjunctivae normal.   Cardiovascular:      Rate and Rhythm: Normal rate and regular rhythm. Heart sounds: Normal heart sounds. Pulmonary:      Effort: Pulmonary effort is normal. No respiratory distress. Breath sounds: Normal breath sounds. No wheezing or rhonchi. Musculoskeletal:      Cervical back: Normal range of motion. Skin:     General: Skin is warm and dry. Neurological:      General: No focal deficit present. Mental Status: He is alert and oriented to person, place, and time.    Psychiatric:         Mood and Affect: Mood normal.         DIFFERENTIAL DIAGNOSIS:   Differential diagnoses are discussed    DIAGNOSTIC RESULTS     EKG: All EKG's are interpreted by the Emergency Department Physician who either signs or Co-signsthis chart in the absence of a cardiologist.        RADIOLOGY: non-plain film images(s) such as CT, Ultrasound and MRI are read by the radiologist.    No orders to display       LABS:      Labs Reviewed   RAPID INFLUENZA A/B ANTIGENS   COVID-19, RAPID       EMERGENCY DEPARTMENT COURSE:   Vitals:    Vitals:    06/07/21 1739   BP: (!) 147/82   Pulse: 99   Resp: 16   SpO2: 99%   Weight: 250 lb (113.4 kg)   Height: 6' (1.829 m)      7:34 PM EDT: The patient was seen and evaluated. Patient presents for complaints of cough and URI symptoms that have been ongoing for the past week. He does have some left ear pain with some retraction and erythema of the tympanic membrane. Mild hypertension, otherwise reassuring vital signs during his ED stay. Flu and Covid screening were negative. Discussed treatment with antihistamine, expectorant and watch and wait antibiotics and patient was agreeable. Prescription for Augmentin provided. He is provided with contact information for the family medicine clinic should he have persistent symptoms. Return precautions were discussed as well. He was agreeable with the above plan and denied further needs upon discharge. CRITICAL CARE:   None    CONSULTS:  None    PROCEDURES:  None    FINAL IMPRESSION      1. Left otitis media, unspecified otitis media type    2. Bronchitis          DISPOSITION/PLAN   Discharge    PATIENT REFERRED TO:  [address] [place] [address],[address]  [address] [facility] [address] [place] [address] Rd  Call   As needed    [address] [facility] [address] [address] EMERGENCY DEPT  [address]    If symptoms worsen      DISCHARGEMEDICATIONS:  Discharge Medication List as of 6/7/2021  7:09 PM      START taking these medications    Details   cetirizine (ZYRTEC) 10 MG tablet Take 1 tablet by mouth daily, Disp-30 tablet, R-0Normal      guaiFENesin-dextromethorphan (ROBITUSSIN DM) 100-10 MG/5ML syrup Take 5 mLs by mouth 3 times daily as needed for Cough, Disp-120 mL, R-0Normal      amoxicillin-clavulanate (AUGMENTIN) 875-125 MG per tablet Take 1 tablet by mouth 2 times daily for 10 days, Disp-20 tablet, R-0Normal             (Please note that portions of this note were completedwith a voice recognition program.  Efforts were made to edit the dictations but occasionally words are mis-transcribed.)        Elizabeth Barbosa PA-C  06/07/21 0030

## 2022-12-25 ENCOUNTER — HOSPITAL ENCOUNTER (EMERGENCY)
Age: 22
Discharge: HOME | End: 2022-12-25
Attending: EMERGENCY MEDICINE
Payer: COMMERCIAL

## 2022-12-25 VITALS
BODY MASS INDEX: 33.86 KG/M2 | WEIGHT: 250 LBS | RESPIRATION RATE: 16 BRPM | SYSTOLIC BLOOD PRESSURE: 145 MMHG | HEIGHT: 72 IN | TEMPERATURE: 99 F | OXYGEN SATURATION: 99 % | HEART RATE: 88 BPM | DIASTOLIC BLOOD PRESSURE: 90 MMHG

## 2022-12-25 DIAGNOSIS — R04.0 BLEEDING FROM THE NOSE: Primary | ICD-10-CM

## 2022-12-25 PROCEDURE — 99283 EMERGENCY DEPT VISIT LOW MDM: CPT

## 2022-12-25 PROCEDURE — 2500000003 HC RX 250 WO HCPCS

## 2022-12-25 RX ORDER — TRANEXAMIC ACID 100 MG/ML
100 INJECTION, SOLUTION INTRAVENOUS ONCE
Status: COMPLETED | OUTPATIENT
Start: 2022-12-25 | End: 2022-12-25

## 2022-12-25 RX ADMIN — TRANEXAMIC ACID 100 MG: 1 INJECTION, SOLUTION INTRAVENOUS at 21:00

## 2022-12-25 RX ADMIN — PHENYLEPHRINE HYDROCHLORIDE 1 SPRAY: 0.5 SPRAY NASAL at 21:00

## 2022-12-25 ASSESSMENT — PAIN - FUNCTIONAL ASSESSMENT
PAIN_FUNCTIONAL_ASSESSMENT: NONE - DENIES PAIN
PAIN_FUNCTIONAL_ASSESSMENT: NONE - DENIES PAIN

## 2022-12-25 ASSESSMENT — PAIN SCALES - GENERAL: PAINLEVEL_OUTOF10: 0

## 2022-12-26 ASSESSMENT — ENCOUNTER SYMPTOMS
COUGH: 0
RESPIRATORY NEGATIVE: 1
SINUS PRESSURE: 0
GASTROINTESTINAL NEGATIVE: 1
ABDOMINAL DISTENTION: 0
EYES NEGATIVE: 1
NAUSEA: 0
PHOTOPHOBIA: 0
SINUS PAIN: 0
ALLERGIC/IMMUNOLOGIC NEGATIVE: 1
ABDOMINAL PAIN: 0
DIARRHEA: 0
SHORTNESS OF BREATH: 0
CONSTIPATION: 0
CHEST TIGHTNESS: 0
VOMITING: 0

## 2022-12-26 NOTE — ED PROVIDER NOTES
[facility] ENCOUNTER          Pt Name: Gudelia Palmer  MRN: [id number]  [place] 2000  Date of evaluation: 12/25/2022  Treating Resident Physician: Delvin Coto MD  Supervising Physician: Dr Leni Wilkins    History obtained from the patient. CHIEF COMPLAINT       Chief Complaint   Patient presents with    Epistaxis           HISTORY OF PRESENT ILLNESS    HPI  Gudelia Palmer is a 23 y.o. male who presents to the emergency department for evaluation of nosebleed    Patient says that he had a nosebleed earlier in the day lasting approximately 15 minutes before resolving and then started bleeding again approximately 20 minutes prior to arrival and was able to get it to stop so he stopped a bunch of paper towel in his nose and came to the emergency department. Patient has never had problems with nosebleeds before and has never been diagnosed with any coagulopathies in the past.  Patient does not feel lightheaded dizzy nausea vomiting chest pain shortness of breath. The patient has no other acute complaints at this time. REVIEW OF SYSTEMS   Review of Systems   Constitutional: Negative. Negative for activity change, chills, fatigue and fever. HENT:  Positive for nosebleeds. Negative for sinus pressure and sinus pain. Eyes: Negative. Negative for photophobia and visual disturbance. Respiratory: Negative. Negative for cough, chest tightness and shortness of breath. Cardiovascular: Negative. Negative for chest pain and leg swelling. Gastrointestinal: Negative. Negative for abdominal distention, abdominal pain, constipation, diarrhea, nausea and vomiting. Endocrine: Negative. Genitourinary: Negative. Negative for dysuria and hematuria. Musculoskeletal: Negative. Skin: Negative. Negative for rash and wound. Allergic/Immunologic: Negative. Neurological: Negative.   Negative for dizziness, light-headedness, numbness and headaches. Hematological: Negative. Psychiatric/Behavioral: Negative. Negative for agitation and confusion. All other systems reviewed and are negative. PAST MEDICAL AND SURGICAL HISTORY   History reviewed. No pertinent past medical history. History reviewed. No pertinent surgical history. MEDICATIONS   No current facility-administered medications for this encounter. Current Outpatient Medications:     ibuprofen (ADVIL;MOTRIN) 800 MG tablet, Take 1 tablet by mouth every 8 hours as needed for Pain, Disp: 15 tablet, Rfl: 0    acetaminophen (TYLENOL) 500 MG tablet, Take 1 tablet by mouth every 4 hours as needed for Pain or Fever, Disp: 20 tablet, Rfl: 0    vitamin C (ASCORBIC ACID) 500 MG tablet, Take 1 tablet by mouth 2 times daily for 7 days, Disp: 14 tablet, Rfl: 0    zinc sulfate (ZINCATE) 220 (50 Zn) MG capsule, Take 1 capsule by mouth daily for 7 days, Disp: 7 capsule, Rfl: 0      SOCIAL HISTORY     Social History     Social History Narrative    Not on file     Social History     Tobacco Use    Smoking status: Never    Smokeless tobacco: Never   Vaping Use    Vaping Use: Former   Substance Use Topics    Alcohol use: Yes     Comment: occ    Drug use: Yes     Types: Marijuana (Weed)     Comment: last used last week          ALLERGIES   No Known Allergies      FAMILY HISTORY   History reviewed. No pertinent family history. PREVIOUS RECORDS   Previous records reviewed: This is this patient's first visit to [facility] ED, no previous records available on EMR. .        PHYSICAL EXAM     ED Triage Vitals [12/25/22 1954]   BP Temp Temp Source Heart Rate Resp SpO2 Height Weight   (!) 162/93 99 °F (37.2 °C) Oral 86 18 98 % 6' (1.829 m) 250 lb (113.4 kg)     Initial vital signs and nursing assessment reviewed and normal. Body mass index is 33.91 kg/m². Pulsoximetry is normal per my interpretation.     Additional Vital Signs:  Vitals:    12/25/22 2248   BP: (!) 145/90   Pulse: 88   Resp: 16   Temp: SpO2: 99%       Physical Exam  Constitutional:       Appearance: He is normal weight. He is not ill-appearing. HENT:      Head: Normocephalic and atraumatic. Nose:      Comments: Significant bleeding from the left nare with overflow into the right whenever occlusion was performed. No obvious source of bleeding identified on initial evaluation. Mouth/Throat:      Mouth: Mucous membranes are moist.      Pharynx: Oropharynx is clear. Eyes:      General: No scleral icterus. Right eye: No discharge. Left eye: No discharge. Pulmonary:      Effort: Pulmonary effort is normal.      Breath sounds: Normal breath sounds. Abdominal:      General: Abdomen is flat. Bowel sounds are normal.      Palpations: Abdomen is soft. Musculoskeletal:         General: No swelling or deformity. Normal range of motion. Cervical back: Normal range of motion. Skin:     General: Skin is warm and dry. Coloration: Skin is not jaundiced. Neurological:      General: No focal deficit present. Mental Status: He is alert and oriented to person, place, and time. Psychiatric:         Mood and Affect: Mood normal.         Behavior: Behavior normal.           MEDICAL DECISION MAKING   Initial Assessment:   Patient is a 27-year-old male presenting with nosebleed. No history of coagulopathy. Patient denies any recreational intranasal drugs and nose picking. I suspect this is likely secondary to dry air. Plan:   Nosebleed protocol including clamping, TXA, Afrin and as necessary Merisel versus Rhino Rocket. .        ED RESULTS   Laboratory results:  Labs Reviewed - No data to display    Radiologic studies results:  No orders to display       ED Medications administered this visit:   Medications   phenylephrine (BRAYDON-SYNEPHRINE) 0.5 % nasal spray 1 spray (1 spray Each Nostril Given 12/25/22 2100)   tranexamic acid (CYKLOKAPRON) injection 100 mg (100 mg Nasal Given 12/25/22 2100)         ED COURSE     ED Course as of 12/26/22 0215   Magdaleno Daugherty Dec 25, 2022   2043 Amounts of bleeding identified from the nose. Clamp was applied for 15 minutes with the patient leaning forward. Upon reevaluation patient still has significant amount of blood in the bleeding from his right nostril. Combination Afrin and TXA was applied to a 2 x 2 gauze which was rolled up and inserted into the nose and clamp reapplied. We will reevaluate. [LINDA]   2129 Patient with persistent nosebleeding despite nasal packing with gauze soaked in TXA and Afrin nasal spray. Gauze was removed. Several spots of bleeding identified in the nose. Attempted with silver nitrate to cauterize the bleeding failed as bleeding persisted around the cauterization. Nose was then packed with a Merisel, 10 cm in length, soaked in TXA and Afrin spray. Nasal clip was replaced on top. We will reevaluate the patient in 5 or 10 minutes. [LINDA]   2217 Merisel was removed and no bleeding was present. Patient was informed will observe him for approximately 20 minutes to make sure he does not rebleed. Patient was informed he should not use Vaseline or petroleum jelly to help keep his nose moist and prevent drying out and rebleeding. Patient continues to deny history of coagulopathy or other bleeding disorders. [LINDA]   2243 Patient's nose remains stable and not bleeding. Shared decision-making was performed and we agree patient suitable for discharge at this time. Patient advised to keep his nostrils moist with an oil-based lubricant such as Vaseline or petroleum jelly. Patient expressed understanding. Patient was advised to return to the emergency department if he has any bleeding does not stop after 10 minutes the Afrin nasal spray and clamping. Patient given the remainder of the bottle of Afrin nasal spray from today's visit as well as a fresh nasal clamp. Patient is otherwise vitally stable at this time.  [LINDA]      ED Course User Index  [LINDA] Loretta Freed MD Strict return precautions and follow up instructions were discussed with the patient prior to discharge, with which the patient agrees. MEDICATION CHANGES     Discharge Medication List as of 12/25/2022 10:28 PM            FINAL DISPOSITION     Final diagnoses:   Bleeding from the nose     Condition: condition: stable  Dispo: Discharge to home      This transcription was electronically signed. Parts of this transcriptions may have been dictated by use of voice recognition software and electronically transcribed, and parts may have been transcribed with the assistance of an ED scribe. The transcription may contain errors not detected in proofreading. Please refer to my supervising physician's documentation if my documentation differs.     Electronically Signed: Lidia Amaro MD, 12/26/22, 2:15 AM          Bj Mock MD  Resident  12/26/22 1614

## 2022-12-26 NOTE — DISCHARGE INSTRUCTIONS
Your nosebleed was stopped with a combination of Afrin nasal spray TXA and nasal packing. We recommend using an oil-based lubrication such as Vaseline jelly or petroleum jelly or Carmex to help keep your nostrils moist and prevent further bleeding. Please follow-up with your primary care doctor following your visit today. You may take the Afrin nasal spray with you today and if you have any rebleeding please read spray your nose and replace the nasal clamp. If bleeding does not stop after 10 minutes please return to the emergency department.

## 2023-06-05 ENCOUNTER — HOSPITAL ENCOUNTER (EMERGENCY)
Age: 23
Discharge: HOME | End: 2023-06-05
Payer: COMMERCIAL

## 2023-06-05 VITALS
HEART RATE: 95 BPM | RESPIRATION RATE: 16 BRPM | DIASTOLIC BLOOD PRESSURE: 81 MMHG | WEIGHT: 230 LBS | HEIGHT: 72 IN | OXYGEN SATURATION: 99 % | SYSTOLIC BLOOD PRESSURE: 152 MMHG | TEMPERATURE: 97 F | BODY MASS INDEX: 31.15 KG/M2

## 2023-06-05 DIAGNOSIS — S06.0X0A CONCUSSION WITHOUT LOSS OF CONSCIOUSNESS, INITIAL ENCOUNTER: Primary | ICD-10-CM

## 2023-06-05 PROCEDURE — 99283 EMERGENCY DEPT VISIT LOW MDM: CPT

## 2023-06-05 PROCEDURE — 6370000000 HC RX 637 (ALT 250 FOR IP): Performed by: NURSE PRACTITIONER

## 2023-06-05 RX ORDER — ACETAMINOPHEN 500 MG
1000 TABLET ORAL ONCE
Status: COMPLETED | OUTPATIENT
Start: 2023-06-05 | End: 2023-06-05

## 2023-06-05 RX ADMIN — ACETAMINOPHEN 1000 MG: 500 TABLET ORAL at 08:30

## 2023-06-05 ASSESSMENT — PAIN DESCRIPTION - LOCATION: LOCATION: HEAD

## 2023-06-05 ASSESSMENT — PAIN - FUNCTIONAL ASSESSMENT: PAIN_FUNCTIONAL_ASSESSMENT: 0-10

## 2023-06-05 ASSESSMENT — PAIN SCALES - GENERAL: PAINLEVEL_OUTOF10: 6

## 2023-06-05 ASSESSMENT — PAIN DESCRIPTION - DESCRIPTORS: DESCRIPTORS: ACHING;DULL

## 2023-06-05 ASSESSMENT — PAIN DESCRIPTION - ORIENTATION: ORIENTATION: RIGHT;POSTERIOR

## 2023-06-05 NOTE — ED PROVIDER NOTES
records my words and actions.     Bossman Stone, BERNARD 06/05/23 8:26 AM    Oziel Spring Counts, APRN - CNP        [employer], APRN - CNP  06/05/23 1948